# Patient Record
Sex: MALE | Race: WHITE | NOT HISPANIC OR LATINO | Employment: OTHER | URBAN - METROPOLITAN AREA
[De-identification: names, ages, dates, MRNs, and addresses within clinical notes are randomized per-mention and may not be internally consistent; named-entity substitution may affect disease eponyms.]

---

## 2023-08-30 ENCOUNTER — HOSPITAL ENCOUNTER (EMERGENCY)
Facility: CLINIC | Age: 68
Discharge: HOME | End: 2023-08-30
Attending: EMERGENCY MEDICINE | Admitting: EMERGENCY MEDICINE
Payer: COMMERCIAL

## 2023-08-30 VITALS
RESPIRATION RATE: 18 BRPM | DIASTOLIC BLOOD PRESSURE: 86 MMHG | TEMPERATURE: 97.8 F | SYSTOLIC BLOOD PRESSURE: 144 MMHG | HEART RATE: 58 BPM | OXYGEN SATURATION: 96 %

## 2023-08-30 DIAGNOSIS — R33.9 URINARY RETENTION: ICD-10-CM

## 2023-08-30 DIAGNOSIS — N41.9 PROSTATITIS, UNSPECIFIED PROSTATITIS TYPE: ICD-10-CM

## 2023-08-30 LAB
ALBUMIN UR-MCNC: 10 MG/DL
ANION GAP SERPL CALCULATED.3IONS-SCNC: 9 MMOL/L (ref 7–15)
APPEARANCE UR: ABNORMAL
BACTERIA #/AREA URNS HPF: ABNORMAL /HPF
BASOPHILS # BLD AUTO: 0.1 10E3/UL (ref 0–0.2)
BASOPHILS NFR BLD AUTO: 1 %
BILIRUB UR QL STRIP: NEGATIVE
BUN SERPL-MCNC: 17.9 MG/DL (ref 8–23)
CALCIUM SERPL-MCNC: 9.6 MG/DL (ref 8.8–10.2)
CHLORIDE SERPL-SCNC: 106 MMOL/L (ref 98–107)
COLOR UR AUTO: YELLOW
CREAT SERPL-MCNC: 0.97 MG/DL (ref 0.67–1.17)
DEPRECATED HCO3 PLAS-SCNC: 26 MMOL/L (ref 22–29)
EOSINOPHIL # BLD AUTO: 0.1 10E3/UL (ref 0–0.7)
EOSINOPHIL NFR BLD AUTO: 1 %
ERYTHROCYTE [DISTWIDTH] IN BLOOD BY AUTOMATED COUNT: 12.5 % (ref 10–15)
GFR SERPL CREATININE-BSD FRML MDRD: 85 ML/MIN/1.73M2
GLUCOSE SERPL-MCNC: 103 MG/DL (ref 70–99)
GLUCOSE UR STRIP-MCNC: NEGATIVE MG/DL
HCT VFR BLD AUTO: 42.5 % (ref 40–53)
HGB BLD-MCNC: 14.2 G/DL (ref 13.3–17.7)
HGB UR QL STRIP: ABNORMAL
IMM GRANULOCYTES # BLD: 0 10E3/UL
IMM GRANULOCYTES NFR BLD: 0 %
KETONES UR STRIP-MCNC: NEGATIVE MG/DL
LEUKOCYTE ESTERASE UR QL STRIP: ABNORMAL
LYMPHOCYTES # BLD AUTO: 1 10E3/UL (ref 0.8–5.3)
LYMPHOCYTES NFR BLD AUTO: 11 %
MCH RBC QN AUTO: 29.8 PG (ref 26.5–33)
MCHC RBC AUTO-ENTMCNC: 33.4 G/DL (ref 31.5–36.5)
MCV RBC AUTO: 89 FL (ref 78–100)
MONOCYTES # BLD AUTO: 0.5 10E3/UL (ref 0–1.3)
MONOCYTES NFR BLD AUTO: 5 %
MUCOUS THREADS #/AREA URNS LPF: PRESENT /LPF
NEUTROPHILS # BLD AUTO: 7.3 10E3/UL (ref 1.6–8.3)
NEUTROPHILS NFR BLD AUTO: 82 %
NITRATE UR QL: POSITIVE
NRBC # BLD AUTO: 0 10E3/UL
NRBC BLD AUTO-RTO: 0 /100
PH UR STRIP: 5.5 [PH] (ref 5–7)
PLATELET # BLD AUTO: 219 10E3/UL (ref 150–450)
POTASSIUM SERPL-SCNC: 4.5 MMOL/L (ref 3.4–5.3)
RBC # BLD AUTO: 4.77 10E6/UL (ref 4.4–5.9)
RBC URINE: 16 /HPF
SODIUM SERPL-SCNC: 141 MMOL/L (ref 136–145)
SP GR UR STRIP: 1.02 (ref 1–1.03)
SQUAMOUS EPITHELIAL: 2 /HPF
UROBILINOGEN UR STRIP-MCNC: NORMAL MG/DL
WBC # BLD AUTO: 8.9 10E3/UL (ref 4–11)
WBC URINE: 136 /HPF
YEAST #/AREA URNS HPF: ABNORMAL /HPF

## 2023-08-30 PROCEDURE — 85025 COMPLETE CBC W/AUTO DIFF WBC: CPT | Performed by: STUDENT IN AN ORGANIZED HEALTH CARE EDUCATION/TRAINING PROGRAM

## 2023-08-30 PROCEDURE — 51798 US URINE CAPACITY MEASURE: CPT

## 2023-08-30 PROCEDURE — 250N000013 HC RX MED GY IP 250 OP 250 PS 637: Performed by: STUDENT IN AN ORGANIZED HEALTH CARE EDUCATION/TRAINING PROGRAM

## 2023-08-30 PROCEDURE — 87088 URINE BACTERIA CULTURE: CPT | Performed by: EMERGENCY MEDICINE

## 2023-08-30 PROCEDURE — 36415 COLL VENOUS BLD VENIPUNCTURE: CPT | Performed by: STUDENT IN AN ORGANIZED HEALTH CARE EDUCATION/TRAINING PROGRAM

## 2023-08-30 PROCEDURE — 81003 URINALYSIS AUTO W/O SCOPE: CPT | Performed by: EMERGENCY MEDICINE

## 2023-08-30 PROCEDURE — 80048 BASIC METABOLIC PNL TOTAL CA: CPT | Performed by: STUDENT IN AN ORGANIZED HEALTH CARE EDUCATION/TRAINING PROGRAM

## 2023-08-30 PROCEDURE — 99285 EMERGENCY DEPT VISIT HI MDM: CPT | Mod: 25

## 2023-08-30 RX ORDER — FLUCONAZOLE 150 MG/1
150 TABLET ORAL ONCE
Status: COMPLETED | OUTPATIENT
Start: 2023-08-30 | End: 2023-08-30

## 2023-08-30 RX ORDER — CIPROFLOXACIN 500 MG/1
500 TABLET, FILM COATED ORAL 2 TIMES DAILY
Qty: 60 TABLET | Refills: 0 | Status: SHIPPED | OUTPATIENT
Start: 2023-08-30 | End: 2023-09-29

## 2023-08-30 RX ORDER — CEPHALEXIN 500 MG/1
500 CAPSULE ORAL ONCE
Status: DISCONTINUED | OUTPATIENT
Start: 2023-08-30 | End: 2023-08-30

## 2023-08-30 RX ORDER — CIPROFLOXACIN 500 MG/1
500 TABLET, FILM COATED ORAL ONCE
Status: COMPLETED | OUTPATIENT
Start: 2023-08-30 | End: 2023-08-30

## 2023-08-30 RX ADMIN — FLUCONAZOLE 150 MG: 150 TABLET ORAL at 10:48

## 2023-08-30 RX ADMIN — CIPROFLOXACIN HYDROCHLORIDE 500 MG: 500 TABLET, FILM COATED ORAL at 10:07

## 2023-08-30 ASSESSMENT — ACTIVITIES OF DAILY LIVING (ADL)
ADLS_ACUITY_SCORE: 35
ADLS_ACUITY_SCORE: 35

## 2023-08-30 NOTE — ED PROVIDER NOTES
History     Chief Complaint:  Urinary Retention       HPI   Dann Bain is a 68 year old male who presents with urinary retention. Patient states that he typically wakes 2 times in the night to urinate. Woke up last night, felt the need to urinate, but could not produce any urine. Developed significant lower abdominal pain along with this. Upon arrival to ED, urge to urinate returned and patient was able to pee sufficient amount. States that abdominal pain has now completed subsided.     States he recently completed urology testing with [facility] for concerns inadequate urinary stream production and was expecting to get call later today with results. Has been told in the past that he has enlarged prostate, recently started medication for this about 2 weeks ago but cannot remember name of med.     Denies any flank pain, history of kidney stones, history of UTI.       Independent Historian:   None - Patient Only    Review of External Notes:   none       Medications:    ciprofloxacin (CIPRO) 500 MG tablet  amLODIPine (NORVASC) 5 MG tablet  diphenhydrAMINE (BENADRYL) 25 MG tablet  lisinopril (PRINIVIL,ZESTRIL) 40 MG tablet        Past Medical History:    No past medical history on file.    Past Surgical History:    No past surgical history on file.     Physical Exam   Patient Vitals for the past 24 hrs:   BP Temp Temp src Pulse Resp SpO2   08/30/23 0709 (!) 155/87 97.8  F (36.6  C) Temporal 65 18 100 %        Physical Exam  General: Alert and cooperative with exam. Patient in no apparent distress. Normal mentation.  Head:  Scalp is NC/AT  Eyes:  No scleral icterus, PERRL  ENT:  The external nose and ears are normal.   Neck:  Normal range of motion without rigidity.  CV:  Regular rate and rhythm    No pathologic murmur   Resp:  Breath sounds are clear bilaterally    Non-labored, no retractions or accessory muscle use  GI:  Abdomen is soft, no distension, no tenderness. No peritoneal signs  MS:  No lower  extremity edema   Skin:  Warm and dry, No rash or lesions noted.  Neuro:  Oriented x 3. No gross motor deficits.      Emergency Department Course     Laboratory:  Labs Ordered and Resulted from Time of ED Arrival to Time of ED Departure   ROUTINE UA WITH MICROSCOPIC REFLEX TO CULTURE - Abnormal       Result Value    Color Urine Yellow      Appearance Urine Slightly Cloudy (*)     Glucose Urine Negative      Bilirubin Urine Negative      Ketones Urine Negative      Specific Gravity Urine 1.019      Blood Urine Trace (*)     pH Urine 5.5      Protein Albumin Urine 10 (*)     Urobilinogen Urine Normal      Nitrite Urine Positive (*)     Leukocyte Esterase Urine Large (*)     Bacteria Urine Few (*)     Budding Yeast Urine Few (*)     Mucus Urine Present (*)     RBC Urine 16 (*)     WBC Urine 136 (*)     Squamous Epithelials Urine 2 (*)    BASIC METABOLIC PANEL - Abnormal    Sodium 141      Potassium 4.5      Chloride 106      Carbon Dioxide (CO2) 26      Anion Gap 9      Urea Nitrogen 17.9      Creatinine 0.97      Calcium 9.6      Glucose 103 (*)     GFR Estimate 85     CBC WITH PLATELETS AND DIFFERENTIAL    WBC Count 8.9      RBC Count 4.77      Hemoglobin 14.2      Hematocrit 42.5      MCV 89      MCH 29.8      MCHC 33.4      RDW 12.5      Platelet Count 219      % Neutrophils 82      % Lymphocytes 11      % Monocytes 5      % Eosinophils 1      % Basophils 1      % Immature Granulocytes 0      NRBCs per 100 WBC 0      Absolute Neutrophils 7.3      Absolute Lymphocytes 1.0      Absolute Monocytes 0.5      Absolute Eosinophils 0.1      Absolute Basophils 0.1      Absolute Immature Granulocytes 0.0      Absolute NRBCs 0.0     URINE CULTURE        Procedures   none    Emergency Department Course & Assessments:    Interventions:  Medications   ciprofloxacin (CIPRO) tablet 500 mg (has no administration in time range)          Independent Interpretation (X-rays, CTs, rhythm strip):  None    Consultations/Discussion of  Management or Tests:  None        Social Determinants of Health affecting care:   None    Disposition:  The patient was discharged to home.     Impression & Plan      Medical Decision Making:  Dann Bain is a 68 year old male who presents with urinary retention and dysuria. No prior history of urinary retention, episode today resolved prior to assessment as patient was able to urinate. At time of exam after urinating, patient without any abdominal pain to palpation. No evidence of cauda equina, spinal abscess or hematoma as patient is without incontinence of numbness to groin. Post void bladder US completed, 100 mL noted. Patient was able to urinate once more and repeat US showed 38 mL, no evidence of ongoing retention. On labs, no evidence of renal dysfunction and Cr and BUN normal. UA with nitrites, LE, and WBC present along with budding yeast. Will treat for prostatitis and candidiasis. Started patient on ciprofloxacin in the ED and provided 1 x dose of diflucan, Rx sent to patient's pharmacy for remaining cipro. Instructed patient to follow up with urology as planned later today and recommend PCP within the week for reassessment. Return to ER if symptoms continue after 1 week or if retention reoccurs.       Diagnosis:    ICD-10-CM    1. Urinary retention  R33.9       2. UTI (urinary tract infection)  N39.0            Discharge Medications:  New Prescriptions    CIPROFLOXACIN (CIPRO) 500 MG TABLET    Take 1 tablet (500 mg) by mouth 2 times daily for 30 days          8/30/2023   SAUL Dodge Lauren R, PA-C  08/30/23 1002       Oralia Parker PA-C  09/01/23 0714

## 2023-08-30 NOTE — ED TRIAGE NOTES
Here for concern of urinary retention. Stated normally go use the restroom twice a night. Got up last night and was unable to urinate. Stated finally was able to urinate in the triage bathroom before being triage. Feels a lot improved, though does burn with urination. ABCs intact.      Triage Assessment       Row Name 08/30/23 0707       Triage Assessment (Adult)    Airway WDL WDL       Respiratory WDL    Respiratory WDL WDL       Cardiac WDL    Cardiac WDL WDL

## 2023-08-30 NOTE — DISCHARGE INSTRUCTIONS
Please take the antibiotics prescribed twice daily for one month. Follow up with urology as scheduled. I also recommend follow up with your PCP within the week for reassessment.    If symptoms worsen or urinary retention reoccurs, please return to ER.

## 2023-08-30 NOTE — ED PROVIDER NOTES
ED ATTENDING PHYSICIAN NOTE:   I evaluated this patient in conjunction with Oralia Parker PA-C  I have participated in the care of the patient and personally performed key elements of the history, exam, and medical decision making.      HPI:   Dann Bain is a 68 year old male with history of hypertension, celiac disease, rheumatoid arthritis who presents to the ER for evaluation of urinary retention.  Last time he was able to urinate was Tuesday night.  Patient reports 4-month history of having a weak stream and inability to completely void his urine at times.  He is seeing urology and has a phone appointment today.  He had a bladder cuff test on Friday and they are calling him with results.  He otherwise denies any fever/chills, flank pain, nausea/vomiting, dysuria or hematuria.  Denies low back pain, saddle anesthesia, and bowel movement incontinence.  He was able to urinate here in the ER with complete relief of his lower abdominal pain.    Independent Historian:   None - Patient Only       EXAM:   General: the patient is awake and interactive  HEENT:  Moist mucous membranes, conjunctiva normal  Pulmonary:  Normal respiratory effort  Cardiovascular:  Well perfused  Abdomen: Soft, nontender, nondistended.    Musculoskeletal:  Moving 4 extremities grossly wnl, no deformities; no CVA tenderness  Neuro:  Speech normal, no focal deficits    MEDICAL DECISION MAKING/ASSESSMENT AND PLAN:   68-year-old male with history of hypertension, celiac disease, rheumatoid arthritis presents to the ER with concerns for urinary retention.  Reports history of weak stream over the last 4 months and has had urodynamic testing on Friday with urology and has phone visit with them this afternoon.  He was able to void on arrival to the ER with relief of his bladder pain.  He has a completely benign abdominal exam.  No signs/symptoms suggestive of spinal cord compression syndrome such as cauda equina requiring MRI imaging of the lumbar  spine.  6 basic lab studies are normal.  UA is concerning for UTI.  Given his symptoms, concern for prostatitis.  Urine culture sent.  He will be started on ciprofloxacin and understands that antibiotic change may be needed if urine culture dictates this.  He is otherwise well-appearing and does not appear septic.  No signs/symptoms concerning for pyelonephritis or concomitant kidney stone.  With reasonable certainty, patient is safe to follow-up with urology this afternoon and they will inform him of results today in the ER.  I do not feel Weller catheter placement is indicated at this time.  Discussed return precautions for the emergency department.  All questions answered prior to discharge.     DIAGNOSIS:     ICD-10-CM    1. Urinary retention  R33.9       2. Prostatitis, unspecified prostatitis type  N41.9            DISPOSITION:   Discharge     Keyshawn Ruiz MD  8/30/2023  [facility] EMERGENCY DEPT       Keyshawn Ruiz MD  08/30/23 1441       Keyshawn Ruiz MD  08/30/23 1442

## 2023-08-31 LAB — BACTERIA UR CULT: ABNORMAL

## 2023-08-31 NOTE — RESULT ENCOUNTER NOTE
[facility] Emergency Dept discharge antibiotic (if prescribed): Ciprofloxacin (Cipro) 500 mg tablet, 1 tablet (500 mg) by mouth 2 times daily for 30 days.   Date of Rx (if applicable):  8/30/23  Recommendations in treatment per [facility] ED Lab result Urine culture protocol.

## 2024-09-01 ENCOUNTER — HEALTH MAINTENANCE LETTER (OUTPATIENT)
Age: 69
End: 2024-09-01